# Patient Record
Sex: FEMALE | Race: WHITE | URBAN - METROPOLITAN AREA
[De-identification: names, ages, dates, MRNs, and addresses within clinical notes are randomized per-mention and may not be internally consistent; named-entity substitution may affect disease eponyms.]

---

## 2018-01-08 ENCOUNTER — ANESTHESIA EVENT (OUTPATIENT)
Dept: SURGERY | Facility: CLINIC | Age: 18
End: 2018-01-08
Payer: COMMERCIAL

## 2018-01-08 ENCOUNTER — HOSPITAL ENCOUNTER (OUTPATIENT)
Facility: CLINIC | Age: 18
Discharge: HOME | End: 2018-01-08
Attending: ORTHOPAEDIC SURGERY | Admitting: ORTHOPAEDIC SURGERY
Payer: COMMERCIAL

## 2018-01-08 ENCOUNTER — ANESTHESIA (OUTPATIENT)
Dept: SURGERY | Facility: CLINIC | Age: 18
End: 2018-01-08
Payer: COMMERCIAL

## 2018-01-08 VITALS
OXYGEN SATURATION: 97 % | HEIGHT: 68 IN | TEMPERATURE: 98.4 F | DIASTOLIC BLOOD PRESSURE: 77 MMHG | BODY MASS INDEX: 35.57 KG/M2 | SYSTOLIC BLOOD PRESSURE: 125 MMHG | RESPIRATION RATE: 20 BRPM | WEIGHT: 234.7 LBS

## 2018-01-08 DIAGNOSIS — Z98.890 S/P ANKLE LIGAMENT REPAIR: Primary | ICD-10-CM

## 2018-01-08 LAB — HCG UR QL: NEGATIVE

## 2018-01-08 PROCEDURE — 25000128 H RX IP 250 OP 636: Performed by: ANESTHESIOLOGY

## 2018-01-08 PROCEDURE — 37000009 ZZH ANESTHESIA TECHNICAL FEE, EACH ADDTL 15 MIN: Performed by: ORTHOPAEDIC SURGERY

## 2018-01-08 PROCEDURE — 27210794 ZZH OR GENERAL SUPPLY STERILE: Performed by: ORTHOPAEDIC SURGERY

## 2018-01-08 PROCEDURE — 71000012 ZZH RECOVERY PHASE 1 LEVEL 1 FIRST HR: Performed by: ORTHOPAEDIC SURGERY

## 2018-01-08 PROCEDURE — 36000058 ZZH SURGERY LEVEL 3 EA 15 ADDTL MIN: Performed by: ORTHOPAEDIC SURGERY

## 2018-01-08 PROCEDURE — 81025 URINE PREGNANCY TEST: CPT | Performed by: ANESTHESIOLOGY

## 2018-01-08 PROCEDURE — 36000060 ZZH SURGERY LEVEL 3 W FLUORO 1ST 30 MIN: Performed by: ORTHOPAEDIC SURGERY

## 2018-01-08 PROCEDURE — 40000170 ZZH STATISTIC PRE-PROCEDURE ASSESSMENT II: Performed by: ORTHOPAEDIC SURGERY

## 2018-01-08 PROCEDURE — 25000132 ZZH RX MED GY IP 250 OP 250 PS 637: Performed by: PHYSICIAN ASSISTANT

## 2018-01-08 PROCEDURE — 71000027 ZZH RECOVERY PHASE 2 EACH 15 MINS: Performed by: ORTHOPAEDIC SURGERY

## 2018-01-08 PROCEDURE — 25000125 ZZHC RX 250: Performed by: NURSE ANESTHETIST, CERTIFIED REGISTERED

## 2018-01-08 PROCEDURE — 25000128 H RX IP 250 OP 636: Performed by: NURSE ANESTHETIST, CERTIFIED REGISTERED

## 2018-01-08 PROCEDURE — 27110028 ZZH OR GENERAL SUPPLY NON-STERILE: Performed by: ORTHOPAEDIC SURGERY

## 2018-01-08 PROCEDURE — 71000013 ZZH RECOVERY PHASE 1 LEVEL 1 EA ADDTL HR: Performed by: ORTHOPAEDIC SURGERY

## 2018-01-08 PROCEDURE — 25000128 H RX IP 250 OP 636: Performed by: PHYSICIAN ASSISTANT

## 2018-01-08 PROCEDURE — 37000008 ZZH ANESTHESIA TECHNICAL FEE, 1ST 30 MIN: Performed by: ORTHOPAEDIC SURGERY

## 2018-01-08 PROCEDURE — C1713 ANCHOR/SCREW BN/BN,TIS/BN: HCPCS | Performed by: ORTHOPAEDIC SURGERY

## 2018-01-08 RX ORDER — CEFAZOLIN SODIUM 1 G
1 VIAL (EA) INJECTION SEE ADMIN INSTRUCTIONS
Status: DISCONTINUED | OUTPATIENT
Start: 2018-01-08 | End: 2018-01-08 | Stop reason: HOSPADM

## 2018-01-08 RX ORDER — NALOXONE HYDROCHLORIDE 0.4 MG/ML
.1-.4 INJECTION, SOLUTION INTRAMUSCULAR; INTRAVENOUS; SUBCUTANEOUS
Status: DISCONTINUED | OUTPATIENT
Start: 2018-01-08 | End: 2018-01-08 | Stop reason: HOSPADM

## 2018-01-08 RX ORDER — PROPOFOL 10 MG/ML
INJECTION, EMULSION INTRAVENOUS PRN
Status: DISCONTINUED | OUTPATIENT
Start: 2018-01-08 | End: 2018-01-08

## 2018-01-08 RX ORDER — LIDOCAINE HYDROCHLORIDE 20 MG/ML
INJECTION, SOLUTION INFILTRATION; PERINEURAL PRN
Status: DISCONTINUED | OUTPATIENT
Start: 2018-01-08 | End: 2018-01-08

## 2018-01-08 RX ORDER — HYDROCODONE BITARTRATE AND ACETAMINOPHEN 5; 325 MG/1; MG/1
1 TABLET ORAL
Status: COMPLETED | OUTPATIENT
Start: 2018-01-08 | End: 2018-01-08

## 2018-01-08 RX ORDER — FENTANYL CITRATE 50 UG/ML
INJECTION, SOLUTION INTRAMUSCULAR; INTRAVENOUS PRN
Status: DISCONTINUED | OUTPATIENT
Start: 2018-01-08 | End: 2018-01-08

## 2018-01-08 RX ORDER — PROPOFOL 10 MG/ML
INJECTION, EMULSION INTRAVENOUS CONTINUOUS PRN
Status: DISCONTINUED | OUTPATIENT
Start: 2018-01-08 | End: 2018-01-08

## 2018-01-08 RX ORDER — KETOROLAC TROMETHAMINE 30 MG/ML
30 INJECTION, SOLUTION INTRAMUSCULAR; INTRAVENOUS EVERY 6 HOURS PRN
Status: DISCONTINUED | OUTPATIENT
Start: 2018-01-08 | End: 2018-01-08 | Stop reason: HOSPADM

## 2018-01-08 RX ORDER — ONDANSETRON 2 MG/ML
INJECTION INTRAMUSCULAR; INTRAVENOUS PRN
Status: DISCONTINUED | OUTPATIENT
Start: 2018-01-08 | End: 2018-01-08

## 2018-01-08 RX ORDER — HYDROXYZINE HYDROCHLORIDE 25 MG/1
25 TABLET, FILM COATED ORAL EVERY 6 HOURS PRN
Qty: 30 TABLET | Refills: 0 | Status: SHIPPED | OUTPATIENT
Start: 2018-01-08

## 2018-01-08 RX ORDER — SODIUM CHLORIDE, SODIUM LACTATE, POTASSIUM CHLORIDE, CALCIUM CHLORIDE 600; 310; 30; 20 MG/100ML; MG/100ML; MG/100ML; MG/100ML
INJECTION, SOLUTION INTRAVENOUS CONTINUOUS
Status: DISCONTINUED | OUTPATIENT
Start: 2018-01-08 | End: 2018-01-08 | Stop reason: HOSPADM

## 2018-01-08 RX ORDER — IBUPROFEN 600 MG/1
600 TABLET, FILM COATED ORAL EVERY 6 HOURS PRN
Qty: 40 TABLET | Refills: 0 | Status: SHIPPED | OUTPATIENT
Start: 2018-01-08

## 2018-01-08 RX ORDER — MEPERIDINE HYDROCHLORIDE 25 MG/ML
12.5 INJECTION INTRAMUSCULAR; INTRAVENOUS; SUBCUTANEOUS
Status: DISCONTINUED | OUTPATIENT
Start: 2018-01-08 | End: 2018-01-08 | Stop reason: HOSPADM

## 2018-01-08 RX ORDER — SODIUM CHLORIDE, SODIUM LACTATE, POTASSIUM CHLORIDE, CALCIUM CHLORIDE 600; 310; 30; 20 MG/100ML; MG/100ML; MG/100ML; MG/100ML
INJECTION, SOLUTION INTRAVENOUS CONTINUOUS PRN
Status: DISCONTINUED | OUTPATIENT
Start: 2018-01-08 | End: 2018-01-08

## 2018-01-08 RX ORDER — ONDANSETRON 4 MG/1
4 TABLET, ORALLY DISINTEGRATING ORAL
Status: DISCONTINUED | OUTPATIENT
Start: 2018-01-08 | End: 2018-01-08 | Stop reason: HOSPADM

## 2018-01-08 RX ORDER — ONDANSETRON 4 MG/1
4 TABLET, ORALLY DISINTEGRATING ORAL EVERY 30 MIN PRN
Status: DISCONTINUED | OUTPATIENT
Start: 2018-01-08 | End: 2018-01-08 | Stop reason: HOSPADM

## 2018-01-08 RX ORDER — FENTANYL CITRATE 50 UG/ML
25-50 INJECTION, SOLUTION INTRAMUSCULAR; INTRAVENOUS
Status: DISCONTINUED | OUTPATIENT
Start: 2018-01-08 | End: 2018-01-08 | Stop reason: HOSPADM

## 2018-01-08 RX ORDER — AMOXICILLIN 250 MG
1-2 CAPSULE ORAL 2 TIMES DAILY
Qty: 30 TABLET | Refills: 0 | Status: SHIPPED | OUTPATIENT
Start: 2018-01-08

## 2018-01-08 RX ORDER — HYDROCODONE BITARTRATE AND ACETAMINOPHEN 5; 325 MG/1; MG/1
1-2 TABLET ORAL EVERY 4 HOURS PRN
Qty: 30 TABLET | Refills: 0 | Status: SHIPPED | OUTPATIENT
Start: 2018-01-08

## 2018-01-08 RX ORDER — FENTANYL CITRATE 50 UG/ML
25-50 INJECTION, SOLUTION INTRAMUSCULAR; INTRAVENOUS EVERY 5 MIN PRN
Status: DISCONTINUED | OUTPATIENT
Start: 2018-01-08 | End: 2018-01-08 | Stop reason: HOSPADM

## 2018-01-08 RX ORDER — HYDROMORPHONE HYDROCHLORIDE 1 MG/ML
.3-.5 INJECTION, SOLUTION INTRAMUSCULAR; INTRAVENOUS; SUBCUTANEOUS EVERY 10 MIN PRN
Status: DISCONTINUED | OUTPATIENT
Start: 2018-01-08 | End: 2018-01-08 | Stop reason: HOSPADM

## 2018-01-08 RX ORDER — ONDANSETRON 2 MG/ML
4 INJECTION INTRAMUSCULAR; INTRAVENOUS EVERY 30 MIN PRN
Status: DISCONTINUED | OUTPATIENT
Start: 2018-01-08 | End: 2018-01-08 | Stop reason: HOSPADM

## 2018-01-08 RX ORDER — DEXAMETHASONE SODIUM PHOSPHATE 4 MG/ML
INJECTION, SOLUTION INTRA-ARTICULAR; INTRALESIONAL; INTRAMUSCULAR; INTRAVENOUS; SOFT TISSUE PRN
Status: DISCONTINUED | OUTPATIENT
Start: 2018-01-08 | End: 2018-01-08

## 2018-01-08 RX ADMIN — SODIUM CHLORIDE, POTASSIUM CHLORIDE, SODIUM LACTATE AND CALCIUM CHLORIDE: 600; 310; 30; 20 INJECTION, SOLUTION INTRAVENOUS at 10:25

## 2018-01-08 RX ADMIN — KETOROLAC TROMETHAMINE 30 MG: 30 INJECTION, SOLUTION INTRAMUSCULAR; INTRAVENOUS at 12:17

## 2018-01-08 RX ADMIN — FENTANYL CITRATE 50 MCG: 50 INJECTION, SOLUTION INTRAMUSCULAR; INTRAVENOUS at 11:02

## 2018-01-08 RX ADMIN — FENTANYL CITRATE 50 MCG: 50 INJECTION, SOLUTION INTRAMUSCULAR; INTRAVENOUS at 11:54

## 2018-01-08 RX ADMIN — CEFAZOLIN SODIUM 2 G: 2 SOLUTION INTRAVENOUS at 11:08

## 2018-01-08 RX ADMIN — DEXMEDETOMIDINE HYDROCHLORIDE 8 MCG: 100 INJECTION, SOLUTION INTRAVENOUS at 11:20

## 2018-01-08 RX ADMIN — Medication 0.5 MG: at 12:34

## 2018-01-08 RX ADMIN — PROPOFOL 175 MCG/KG/MIN: 10 INJECTION, EMULSION INTRAVENOUS at 11:03

## 2018-01-08 RX ADMIN — PROPOFOL 200 MG: 10 INJECTION, EMULSION INTRAVENOUS at 11:03

## 2018-01-08 RX ADMIN — LIDOCAINE HYDROCHLORIDE 80 MG: 20 INJECTION, SOLUTION INFILTRATION; PERINEURAL at 11:03

## 2018-01-08 RX ADMIN — FENTANYL CITRATE 50 MCG: 50 INJECTION, SOLUTION INTRAMUSCULAR; INTRAVENOUS at 11:45

## 2018-01-08 RX ADMIN — HYDROCODONE BITARTRATE AND ACETAMINOPHEN 1 TABLET: 5; 325 TABLET ORAL at 12:17

## 2018-01-08 RX ADMIN — MIDAZOLAM 2 MG: 1 INJECTION INTRAMUSCULAR; INTRAVENOUS at 10:25

## 2018-01-08 RX ADMIN — ONDANSETRON 4 MG: 2 INJECTION INTRAMUSCULAR; INTRAVENOUS at 11:08

## 2018-01-08 RX ADMIN — DEXAMETHASONE SODIUM PHOSPHATE 4 MG: 4 INJECTION, SOLUTION INTRA-ARTICULAR; INTRALESIONAL; INTRAMUSCULAR; INTRAVENOUS; SOFT TISSUE at 11:08

## 2018-01-08 RX ADMIN — FENTANYL CITRATE 50 MCG: 50 INJECTION, SOLUTION INTRAMUSCULAR; INTRAVENOUS at 12:03

## 2018-01-08 RX ADMIN — MIDAZOLAM 2 MG: 1 INJECTION INTRAMUSCULAR; INTRAVENOUS at 11:01

## 2018-01-08 RX ADMIN — ROPIVACAINE HYDROCHLORIDE 50 ML: 5 INJECTION, SOLUTION EPIDURAL; INFILTRATION; PERINEURAL at 10:35

## 2018-01-08 NOTE — OP NOTE
DATE OF PROCEDURE:  01/08/2018       PREOPERATIVE DIAGNOSIS:  Chronic instability of the left ankle.      POSTOPERATIVE DIAGNOSIS:  Chronic instability of the left ankle.      PROCEDURE:     1.  Arthroscopy and arthroscopic debridement of the left ankle.   2.  Open Brostrom lateral ligament repair augmented with an internal brace.      ANESTHESIA:  General.      ASSISTANT:  JUANA Francois       PREAMBLE:  Ms. Arianna Harper presented with a chronic instability of her left ankle.  She tried conservative management to no avail.  Informed consent was obtained for the above-mentioned procedure.      Two grams of Ancef was given prior to surgery.  There is no need for postoperative antibiotic prophylaxis.      DESCRIPTION OF PROCEDURE:  After adequate induction of a general anesthetic, the patient was positioned supine on the operating table.  The left leg was sterilely prepped and free draped in the usual fashion.  Tourniquet around the thigh was inflated to 300 mmHg.      A standard arthroscopy was done with medial and lateral portals.  The scope was first inserted through the medial portal.  There was moderate synovitis of the joint, and a partial synovectomy was done.  There was significant anterolateral tissue overgrowth due to the instability, and this was removed with a shaver.  She had a positive anterior drawer and talar tilt test on the lateral side.      The deltoid ligament was intact.  There was grade 1 softening of the articular cartilage over the medial talar dome, but overall the articular cartilage was in good condition.      The scope was then removed.  This was followed by a curvilinear incision lateral over the ankle.  The lateral ligament complex was exposed.  A Brostrom type repair was done, shortening and imbricating the anterior talofibular and calcaneofibular ligaments onto the fibula with FiberWire suture.  Stable repair was obtained.      Due to the longstanding instability, an internal  brace was used to augment the repair with SwiveLock anchors in the foot prints of the ATF in the fibula and the talus.  A very stable repair was obtained.  The tourniquet was deflated.  Hemostasis was obtained.  A sterile dressing and a light compressive bandage were applied, followed by a short-leg cast.  She tolerated the procedure well and was taken to the recovery room in satisfactory condition.      She can ambulate weightbearing as tolerated.  The sutures will be removed in 2 weeks.         CLAYTON SALAZAR MD             D: 2018 11:47   T: 2018 13:59   MT: NEO#[id number]      Name:     GAURANG LESLIE   MRN:      [id number]        Account:        [id number]   :      2000           Procedure Date: 2018      Document: [id number]

## 2018-01-08 NOTE — DISCHARGE INSTRUCTIONS
Same Day Surgery Discharge Instructions for  Sedation and General Anesthesia       It's not unusual to feel dizzy, light-headed or faint for up to 24 hours after surgery or while taking pain medication.  If you have these symptoms: sit for a few minutes before standing and have someone assist you when you get up to walk or use the bathroom.      You should rest and relax for the next 24 hours. We recommend you make arrangements to have an adult stay with you for at least 24 hours after your discharge.  Avoid hazardous and strenuous activity.      DO NOT DRIVE any vehicle or operate mechanical equipment for 24 hours following the end of your surgery.  Even though you may feel normal, your reactions may be affected by the medication you have received.      Do not drink alcoholic beverages for 24 hours following surgery.       Slowly progress to your regular diet as you feel able. It's not unusual to feel nauseated and/or vomit after receiving anesthesia.  If you develop these symptoms, drink clear liquids (apple juice, ginger ale, broth, 7-up, etc. ) until you feel better.  If your nausea and vomiting persists for 24 hours, please notify your surgeon.        All narcotic pain medications, along with inactivity and anesthesia, can cause constipation. Drinking plenty of liquids and increasing fiber intake will help.      For any questions of a medical nature, call your surgeon.      Do not make important decisions for 24 hours.      If you had general anesthesia, you may have a sore throat for a couple of days related to the breathing tube used during surgery.  You may use Cepacol lozenges to help with this discomfort.  If it worsens or if you develop a fever, contact your surgeon.       If you feel your pain is not well managed with the pain medications prescribed by your surgeon, please contact your surgeon's office to let them know so they can address your concerns.       POST-OPERATIVE INSTRUCTIONS  FOOT AND ANKLE  SURGERY  ELAN Joshi M.D.  Barry Mejia P.A.-C    These precautions MUST be followed for the first 24 hours after surgery:    Upon discharge, go directly home.    You must make arrangements to have a responsible adult stay with you.    DO NOT DRINK ALCOHOLIC BEVERAGES    It is not unusual to feel lightheaded up to 24 hours after surgery or while taking pain medication.  If you feel lightheaded, sit for a few minutes before standing and have someone assisted you when you get up to walk or use the restroom.    Do not use any mechanical equipment.    Do not make any important or legal decisions for 24 hours or while on pain medications.    You may experience dry mouth, sore throat, or sleep disturbances from the anesthesia and medications used during surgery.  Generalized muscle aches can sometimes occur.  These usually disappear in 12-24 hours.    POST-OPERATIVE CARE GUIDELINES    The following are general guidelines about what to expect the first days/weeks after surgery.  They are not specific, and your recovery may be slightly different.    Blood clots are not common, but are emergencies.  If you have sudden onset pain in your calf or leg, or have sudden shortness of breath, go to the Emergency Department.      Elevation of your operative foot/ankle is key to reducing the swelling in the immediate post-operative phase (first 3-5 days).  When you are at rest, elevate your foot at or above the level of your heart.  When sitting, your foot should be elevated on a chair or stool; not hanging down.      You should plan to rest the day after surgery no matter how minor the procedure.  More complicated procedures will require more time to return to normal activity.      Foot and ankle surgery is painful in most cases - use your medication as directed for the first 24 hours after surgery.  It is not unusual for the pain to be worse a day or two after surgery than on the day of.  If your pain is more than 8/10  contact our office.  If you don t have pain, gradually decrease your pain meds by substituting Tylenol.  Please don t use Advil/ibuprofen unless we order it for you.      You will be prescribed Percocet or Vicodin for pain, Vistaril for spasms/pain adjunct, Senokot for constipation.  If you have had trouble taking these meds before or experience nausea or vomiting after surgery from your medication, please advise the recovery room nurses.  If you are already at home, try drinking only clear liquids and/or call our office.      All pain medications, along with inactivity can cause constipation.  Use the Senakot as directed, increase fluid intake to 1 quart per day and increase your dietary fiber.  (The  P  fruits - peaches, plums, pears, and prunes as well as anise/black licorice are recommended.)    It is not unusual to run a low-grade fever after surgery.  If your temperature is elevated above 102 , lasts longer than 24 hours, or is questionable in any way, contact our office.      Drainage from your cast/dressing is normal.  Reinforce your cast/dressing with 4x4 dressings and cover with an Ace wrap.  Wait until 24 hours after surgery to change your dressings; by this time most of your bleeding will have stopped.  If you have drainage through your dressings 2 days after surgery, contact our office.      You cast/dressing will be changed at your 2-week follow up appointment.  They should be kept clean and DRY.  If your cast/dressing is damaged before that, contact our office.      It is normal to experience some bruising in the toes after surgery and they may appear  dark  when your foot hangs down.  It is important to actively wiggle your toes for at least 5-10 minutes each hour UNLESS you had surgery on those toes.      Use ice on your foot/ankle over the dressing/cast for 20 minutes per hour, 10 or more times per day.  A large bag of frozen peas works well.      Bathing: take a tub or sponge bath instead of a  shower if possible during the first two weeks.  If you choose to shower, cover the dressing/cast with a waterproof covering, these may be ordered from www.seal-tight.com or are available at some pharmacies/medical supply stores.  Another option is XeroSox, which is the original vacuum sealed bandage and cast cover.  The cast cover has a vacuum seal and is absolutely waterproof.  [phone] or www.xerosox[employer] for more information.      Driving:  For surgery on the left foot/ankle, you may drive as soon as narcotic medications are stopped.  For surgery on the right foot/ankle, you may drive when you are out of a cast, off pain medications, and you feel secure with braking.      In general, listen to your foot/ankle.  If you have a sudden, dramatic increase in pain that does not resolve after an hour or so, something is wrong - call our office.  If you fall or bump your foot/ankle and have sudden pain that resolves, give it 24 hours before you call.      Many of your questions can be addressed at your 2-week follow-up appointment - please make a list of things to ask us as they come up during your recovery.      If you had a nerve block it is common to have numbness in your leg and foot for up to 30 hours after surgery.  If your leg or foot is still numb more than 30 hours after surgery, please call the office.      FUTURE DENTIST VISITS:  If you have had a total ankle arthroplasty please be advised you must be on antibiotics prior to ANY dental work.  Please call your dentist office ahead of time and make them aware of this as your dentist will be able to order the prescription.  If you have had any other surgery this is not a concern.    If you have any further questions or concerns, please call our office at [phone]    **If you have questions or concerns about your procedure,   call Dr. Joshi at [phone]**    While you were at the hospital today you received Toradol, an antiinflammatory medication  "similar to Ibuprofen.  You should not take other antiinflammatory medication, such as Ibuprofen, Motrin, Advil, Aleve, Naprosyn, etc, until 6:15pm.         [facility]      DISCHARGE INSTRUCTIONS FOLLOWING   REGIONAL BLOCK ANESTHESIA      Numbness or lack of feeling in the arm/leg that was operated may last up to 24 hours.  The average time is usually 10-15 hours.  You may not be able to lift or move the arm or leg where the operation was by itself during that time.  Long-acting local anesthetic medicines were used to give you long-lasting pain relief.    Wear a sling until your arm is completely \"awake\"    Avoid bumping your arm, leg or foot while it is numb    Avoid extremes of hot or cold while it is numb    Remain quiet and restful the day of surgery.  Resume normal activities gradually over the next day or so as advise by your surgeon.    Do not drive or operate  Any machinery until your extremity is full  \"awake\"        You will have a tingling and prickly sensation in your arm/leg as the feeling begins to return; you can also expect some discomfort. The amount of discomfort is unpredictable, but if you have more pain that can be controlled with the pain medication you received, you should contact your surgeon.  Start to take your pain pills as soon as you start to feel any discomfort or pain.  We strongly recommend starting your pain medication at bedtime if you haven't already done so.  This is in the event that the block wears off while you are asleep.                    "

## 2018-01-08 NOTE — ANESTHESIA CARE TRANSFER NOTE
Patient: Arianna Harper    Procedure(s):  LEFT ANKLE SCOPE BROSTROM AND INTERNAL BRACE(INTERNAL BRACE AND SCOPE) - Wound Class: I-Clean    Diagnosis: ANKLE INSTABILITY  Diagnosis Additional Information: No value filed.    Anesthesia Type:   General, LMA, Periph. Nerve Block for postop pain     Note:  Airway :Face Mask  Patient transferred to:PACU  Comments: At end of procedure, spontaneous respirations, adequate tidal volumes, followed commands to voice, LMA removed atraumatically, oropharynx suctioned, airway patent after LMA removal. Oxygen via facemask at 8 liters per minute to PACU. Oxygen tubing connected to wall O2 in PACU, SpO2, NiBP, and EKG monitors and alarms on and functioning, John Hugger warmer connected to patient gown, report on patient's clinical status given to PACU RN, RN questions answered.Handoff Report: Identifed the Patient, Identified the Reponsible Provider, Reviewed the pertinent medical history, Discussed the surgical course, Reviewed Intra-OP anesthesia mangement and issues during anesthesia, Set expectations for post-procedure period and Allowed opportunity for questions and acknowledgement of understanding      Vitals: (Last set prior to Anesthesia Care Transfer)    CRNA VITALS  1/8/2018 1112 - 1/8/2018 1146      1/8/2018             Resp Rate (observed): 8                Electronically Signed By: KENTRELL Ibarra CRNA  January 8, 2018  11:46 AM

## 2018-01-08 NOTE — ANESTHESIA POSTPROCEDURE EVALUATION
Patient: Arianna Harper    Procedure(s):  LEFT ANKLE SCOPE BROSTROM AND INTERNAL BRACE(INTERNAL BRACE AND SCOPE) - Wound Class: I-Clean    Diagnosis:ANKLE INSTABILITY  Diagnosis Additional Information: No value filed.    Anesthesia Type:  General, LMA, Periph. Nerve Block for postop pain    Note:  Anesthesia Post Evaluation    Patient location during evaluation: PACU  Patient participation: Able to fully participate in evaluation  Level of consciousness: awake  Pain management: adequate  Airway patency: patent  Cardiovascular status: acceptable  Respiratory status: acceptable  Hydration status: acceptable  PONV: none     Anesthetic complications: None          Last vitals:  Vitals:    01/08/18 1230 01/08/18 1245 01/08/18 1336   BP: 123/75 124/75 125/77   Resp: 28 19 20   Temp: 36.7  C (98.1  F) 36.9  C (98.4  F)    SpO2: 98% 97% 97%         Electronically Signed By: Christiano Cantrell MD  January 8, 2018  1:39 PM

## 2018-01-08 NOTE — ANESTHESIA PREPROCEDURE EVALUATION
Anesthesia Evaluation     .      No history of anesthetic complications          ROS/MED HX    ENT/Pulmonary:  - neg pulmonary ROS     Neurologic:       Cardiovascular:  - neg cardiovascular ROS       METS/Exercise Tolerance:     Hematologic:         Musculoskeletal:         GI/Hepatic:  - neg GI/hepatic ROS       Renal/Genitourinary:         Endo:     (+) Obesity, .      Psychiatric:         Infectious Disease:         Malignancy:         Other:                     Physical Exam  Normal systems: cardiovascular, pulmonary and dental    Airway   Mallampati: I  TM distance: >3 FB  Neck ROM: full    Dental     Cardiovascular   Rhythm and rate: regular and normal      Pulmonary    breath sounds clear to auscultation                    Anesthesia Plan      History & Physical Review  History and physical reviewed and following examination; no interval change.    ASA Status:  2 .    NPO Status:  > 8 hours    Plan for General, LMA and Periph. Nerve Block for postop pain with Propofol induction. Maintenance will be TIVA.    PONV prophylaxis:  Ondansetron (or other 5HT-3) and Dexamethasone or Solumedrol       Postoperative Care  Postoperative pain management:  IV analgesics and Oral pain medications.      Consents  Anesthetic plan, risks, benefits and alternatives discussed with:  Patient..                          .

## 2018-01-08 NOTE — IP AVS SNAPSHOT
MRN:[id number]                      After Visit Summary   1/8/2018    Arianna Harper    MRN: [id number]           Thank you!     Thank you for choosing [facility] for your care. Our goal is always to provide you with excellent care. Hearing back from our patients is one way we can continue to improve our services. Please take a few minutes to complete the written survey that you may receive in the mail after you visit with us. Thank you!        Patient Information     Date Of Birth          2000        About your hospital stay     You were admitted on:  January 8, 2018 You last received care in the:  [facility]    You were discharged on:  January 8, 2018       Who to Call     For medical emergencies, please call 911.  For non-urgent questions about your medical care, please call your primary care provider or clinic, [phone]  For questions related to your surgery, please call your surgery clinic        Attending Provider     Provider Christie Hernandez MD Orthopaedic Surgery       Primary Care Provider Office Phone # Fax #    Kristal Duran [phone]      After Care Instructions     Activity       Ambulate with assistance until independent            Discharge Instructions       Review discharge instructions as directed by Provider.            Discharge Instructions       Patient to arrange for return to clinic appointment in two weeks.            Elevate affected extremity           Ice to affected area       Ice pack to affected area PRN (as needed).            No dressing change       until follow up clinic appointment.            No driving or operating machinery       until the day after procedure            Weight bearing status - As tolerated                 Further instructions from your care team       Same Day Surgery Discharge Instructions for  Sedation and General Anesthesia       It's not unusual to feel dizzy,  light-headed or faint for up to 24 hours after surgery or while taking pain medication.  If you have these symptoms: sit for a few minutes before standing and have someone assist you when you get up to walk or use the bathroom.      You should rest and relax for the next 24 hours. We recommend you make arrangements to have an adult stay with you for at least 24 hours after your discharge.  Avoid hazardous and strenuous activity.      DO NOT DRIVE any vehicle or operate mechanical equipment for 24 hours following the end of your surgery.  Even though you may feel normal, your reactions may be affected by the medication you have received.      Do not drink alcoholic beverages for 24 hours following surgery.       Slowly progress to your regular diet as you feel able. It's not unusual to feel nauseated and/or vomit after receiving anesthesia.  If you develop these symptoms, drink clear liquids (apple juice, ginger ale, broth, 7-up, etc. ) until you feel better.  If your nausea and vomiting persists for 24 hours, please notify your surgeon.        All narcotic pain medications, along with inactivity and anesthesia, can cause constipation. Drinking plenty of liquids and increasing fiber intake will help.      For any questions of a medical nature, call your surgeon.      Do not make important decisions for 24 hours.      If you had general anesthesia, you may have a sore throat for a couple of days related to the breathing tube used during surgery.  You may use Cepacol lozenges to help with this discomfort.  If it worsens or if you develop a fever, contact your surgeon.       If you feel your pain is not well managed with the pain medications prescribed by your surgeon, please contact your surgeon's office to let them know so they can address your concerns.       POST-OPERATIVE INSTRUCTIONS  FOOT AND ANKLE SURGERY  ELAN Joshi M.D.  Barry Mejia P.A.-C    These precautions MUST be followed for the first 24 hours  after surgery:    Upon discharge, go directly home.    You must make arrangements to have a responsible adult stay with you.    DO NOT DRINK ALCOHOLIC BEVERAGES    It is not unusual to feel lightheaded up to 24 hours after surgery or while taking pain medication.  If you feel lightheaded, sit for a few minutes before standing and have someone assisted you when you get up to walk or use the restroom.    Do not use any mechanical equipment.    Do not make any important or legal decisions for 24 hours or while on pain medications.    You may experience dry mouth, sore throat, or sleep disturbances from the anesthesia and medications used during surgery.  Generalized muscle aches can sometimes occur.  These usually disappear in 12-24 hours.    POST-OPERATIVE CARE GUIDELINES    The following are general guidelines about what to expect the first days/weeks after surgery.  They are not specific, and your recovery may be slightly different.    Blood clots are not common, but are emergencies.  If you have sudden onset pain in your calf or leg, or have sudden shortness of breath, go to the Emergency Department.      Elevation of your operative foot/ankle is key to reducing the swelling in the immediate post-operative phase (first 3-5 days).  When you are at rest, elevate your foot at or above the level of your heart.  When sitting, your foot should be elevated on a chair or stool; not hanging down.      You should plan to rest the day after surgery no matter how minor the procedure.  More complicated procedures will require more time to return to normal activity.      Foot and ankle surgery is painful in most cases - use your medication as directed for the first 24 hours after surgery.  It is not unusual for the pain to be worse a day or two after surgery than on the day of.  If your pain is more than 8/10 contact our office.  If you don t have pain, gradually decrease your pain meds by substituting Tylenol.  Please don t use  Advil/ibuprofen unless we order it for you.      You will be prescribed Percocet or Vicodin for pain, Vistaril for spasms/pain adjunct, Senokot for constipation.  If you have had trouble taking these meds before or experience nausea or vomiting after surgery from your medication, please advise the recovery room nurses.  If you are already at home, try drinking only clear liquids and/or call our office.      All pain medications, along with inactivity can cause constipation.  Use the Senakot as directed, increase fluid intake to 1 quart per day and increase your dietary fiber.  (The  P  fruits - peaches, plums, pears, and prunes as well as anise/black licorice are recommended.)    It is not unusual to run a low-grade fever after surgery.  If your temperature is elevated above 102 , lasts longer than 24 hours, or is questionable in any way, contact our office.      Drainage from your cast/dressing is normal.  Reinforce your cast/dressing with 4x4 dressings and cover with an Ace wrap.  Wait until 24 hours after surgery to change your dressings; by this time most of your bleeding will have stopped.  If you have drainage through your dressings 2 days after surgery, contact our office.      You cast/dressing will be changed at your 2-week follow up appointment.  They should be kept clean and DRY.  If your cast/dressing is damaged before that, contact our office.      It is normal to experience some bruising in the toes after surgery and they may appear  dark  when your foot hangs down.  It is important to actively wiggle your toes for at least 5-10 minutes each hour UNLESS you had surgery on those toes.      Use ice on your foot/ankle over the dressing/cast for 20 minutes per hour, 10 or more times per day.  A large bag of frozen peas works well.      Bathing: take a tub or sponge bath instead of a shower if possible during the first two weeks.  If you choose to shower, cover the dressing/cast with a waterproof covering,  these may be ordered from www.seal-tight.com or are available at some pharmacies/medical supply stores.  Another option is XeroSox, which is the original vacuum sealed bandage and cast cover.  The cast cover has a vacuum seal and is absolutely waterproof.  1-221.197.9582 or www.xerosox.[employer] for more information.      Driving:  For surgery on the left foot/ankle, you may drive as soon as narcotic medications are stopped.  For surgery on the right foot/ankle, you may drive when you are out of a cast, off pain medications, and you feel secure with braking.      In general, listen to your foot/ankle.  If you have a sudden, dramatic increase in pain that does not resolve after an hour or so, something is wrong - call our office.  If you fall or bump your foot/ankle and have sudden pain that resolves, give it 24 hours before you call.      Many of your questions can be addressed at your 2-week follow-up appointment - please make a list of things to ask us as they come up during your recovery.      If you had a nerve block it is common to have numbness in your leg and foot for up to 30 hours after surgery.  If your leg or foot is still numb more than 30 hours after surgery, please call the office.      FUTURE DENTIST VISITS:  If you have had a total ankle arthroplasty please be advised you must be on antibiotics prior to ANY dental work.  Please call your dentist office ahead of time and make them aware of this as your dentist will be able to order the prescription.  If you have had any other surgery this is not a concern.    If you have any further questions or concerns, please call our office at [phone]    **If you have questions or concerns about your procedure,   call Dr. Joshi at [phone]**    While you were at the hospital today you received Toradol, an antiinflammatory medication similar to Ibuprofen.  You should not take other antiinflammatory medication, such as Ibuprofen, Motrin, Advil, Aleve,  "Naprosyn, etc, until 6:15pm.         [facility]      DISCHARGE INSTRUCTIONS FOLLOWING   REGIONAL BLOCK ANESTHESIA      Numbness or lack of feeling in the arm/leg that was operated may last up to 24 hours.  The average time is usually 10-15 hours.  You may not be able to lift or move the arm or leg where the operation was by itself during that time.  Long-acting local anesthetic medicines were used to give you long-lasting pain relief.    Wear a sling until your arm is completely \"awake\"    Avoid bumping your arm, leg or foot while it is numb    Avoid extremes of hot or cold while it is numb    Remain quiet and restful the day of surgery.  Resume normal activities gradually over the next day or so as advise by your surgeon.    Do not drive or operate  Any machinery until your extremity is full  \"awake\"        You will have a tingling and prickly sensation in your arm/leg as the feeling begins to return; you can also expect some discomfort. The amount of discomfort is unpredictable, but if you have more pain that can be controlled with the pain medication you received, you should contact your surgeon.  Start to take your pain pills as soon as you start to feel any discomfort or pain.  We strongly recommend starting your pain medication at bedtime if you haven't already done so.  This is in the event that the block wears off while you are asleep.                      Pending Results     No orders found from 1/6/2018 to 1/9/2018.            Statement of Approval     Ordered          01/08/18 1037  I have reviewed and agree with all the recommendations and orders detailed in this document.  EFFECTIVE NOW     Approved and electronically signed by:  Braulio Mejia PA-C             Admission Information     Date & Time Provider Department Dept. Phone    1/8/2018 Christie Joshi MD [facility] Same Day [facility] [phone]      Your Vitals Were     Blood Pressure Temperature Respirations Height " "Weight Last Period     98.1  F (36.7  C) 28 1.715 m (5' 7.5\") 106.5 kg (234 lb 11.2 oz) 2017    Pulse Oximetry BMI (Body Mass Index)                98% 36.22 kg/m2          [employer] Information     [employer] lets you send messages to your doctor, view your test results, renew your prescriptions, schedule appointments and more. To sign up, go to [email or web address] . Click on \"Log in\" on the left side of the screen, which will take you to the Welcome page. Then click on \"Sign up Now\" on the right side of the page.     You will be asked to enter the access code listed below, as well as some personal information. Please follow the directions to create your username and password.     Your access code is: [email or web address]  Expires: 2018 12:19 PM     Your access code will  in 90 days. If you need help or a new code, please call your [facility] clinic or [phone].        Care EveryWhere ID     This is your Care EveryWhere ID. This could be used by other organizations to access your [facility] medical records  [id number]        Equal Access to Services     RAQUEL TSANG AH: Kristi Sawyer, waromario ashby, qahamiltonta kaalmada jigar, tran burns. So [facility] [phone].    ATENCIÓN: Si habla español, tiene a long disposición servicios gratuitos de asistencia lingüística. Llame al [phone].    We comply with applicable federal civil rights laws and [place] laws. We do not discriminate on the basis of race, color, national origin, age, disability, sex, sexual orientation, or gender identity.               Review of your medicines      START taking        Dose / Directions    HYDROcodone-acetaminophen 5-325 MG per tablet   Commonly known as:  NORCO   Used for:  S/P ankle ligament repair        Dose:  1-2 tablet   Take 1-2 tablets by mouth every 4 hours as needed for other (Moderate to Severe Pain)   Quantity:  30 tablet   Refills:  0       hydrOXYzine 25 MG tablet "   Commonly known as:  ATARAX   Used for:  S/P ankle ligament repair        Dose:  25 mg   Take 1 tablet (25 mg) by mouth every 6 hours as needed for itching (and nausea)   Quantity:  30 tablet   Refills:  0       ibuprofen 600 MG tablet   Commonly known as:  ADVIL/MOTRIN   Used for:  S/P ankle ligament repair        Dose:  600 mg   Take 1 tablet (600 mg) by mouth every 6 hours as needed for pain (mild)   Quantity:  40 tablet   Refills:  0       senna-docusate 8.6-50 MG per tablet   Commonly known as:  SENOKOT-S;PERICOLACE   Used for:  S/P ankle ligament repair        Dose:  1-2 tablet   Take 1-2 tablets by mouth 2 times daily Take while on oral narcotics to prevent or treat constipation.   Quantity:  30 tablet   Refills:  0         CONTINUE these medicines which have NOT CHANGED        Dose / Directions    VITAMIN C PO        Take by mouth as needed   Refills:  0            Where to get your medicines      These medications were sent to [facility] [place] - [place] - [address]  [address] [facility] [address], [facility] [address]     Phone:  [phone]     hydrOXYzine 25 MG tablet    ibuprofen 600 MG tablet    senna-docusate 8.6-50 MG per tablet         Some of these will need a paper prescription and others can be bought over the counter. Ask your nurse if you have questions.     Bring a paper prescription for each of these medications     HYDROcodone-acetaminophen 5-325 MG per tablet                Protect others around you: Learn how to safely use, store and throw away your medicines at www.disposemymeds.org.             Medication List: This is a list of all your medications and when to take them. Check marks below indicate your daily home schedule. Keep this list as a reference.      Medications           Morning Afternoon Evening Bedtime As Needed    HYDROcodone-acetaminophen 5-325 MG per tablet   Commonly known as:  NORCO   Take 1-2 tablets by mouth every 4 hours as needed for other (Moderate to  Severe Pain)   Last time this was given:  1 tablet on 1/8/2018 12:17 PM   Last time this was given:  Last dose was at 12:15 ( 1 tab given)                                hydrOXYzine 25 MG tablet   Commonly known as:  ATARAX   Take 1 tablet (25 mg) by mouth every 6 hours as needed for itching (and nausea)                                ibuprofen 600 MG tablet   Commonly known as:  ADVIL/MOTRIN   Take 1 tablet (600 mg) by mouth every 6 hours as needed for pain (mild)   Last time this was given:  Last dose given at 12:15 (Toradol; similar to Advil).                                senna-docusate 8.6-50 MG per tablet   Commonly known as:  SENOKOT-S;PERICOLACE   Take 1-2 tablets by mouth 2 times daily Take while on oral narcotics to prevent or treat constipation.                                VITAMIN C PO   Take by mouth as needed

## 2018-01-08 NOTE — IP AVS SNAPSHOT
[facility]    [address]    [place] [address]    Phone:  [phone]    Fax:  [phone]                                       After Visit Summary   1/8/2018    Arianna Harper    MRN: [id number]           After Visit Summary Signature Page     I have received my discharge instructions, and my questions have been answered. I have discussed any challenges I see with this plan with the nurse or doctor.    ..........................................................................................................................................  Patient/Patient Representative Signature      ..........................................................................................................................................  Patient Representative Print Name and Relationship to Patient    ..................................................               ................................................  Date                                            Time    ..........................................................................................................................................  Reviewed by Signature/Title    ...................................................              ..............................................  Date                                                            Time

## 2018-01-08 NOTE — ANESTHESIA PROCEDURE NOTES
Procedures  Left popliteal and saphenous blocks for pain relief at surgeon's request.  With the patient in the prone position, after IV started,  pulse oximeter in place and Versed 2mg IV: 22 GA Stimuplex and 25 GA; 50cc (40 + 10) 1/2 % Ropivicaine; 1;200,000 Epi   Depth 6cm

## 2018-02-02 ENCOUNTER — THERAPY VISIT (OUTPATIENT)
Dept: PHYSICAL THERAPY | Facility: CLINIC | Age: 18
End: 2018-02-02
Payer: COMMERCIAL

## 2018-02-02 DIAGNOSIS — M25.572 ANKLE PAIN, LEFT: Primary | ICD-10-CM

## 2018-02-02 PROCEDURE — 97161 PT EVAL LOW COMPLEX 20 MIN: CPT | Mod: GP | Performed by: PHYSICAL THERAPIST

## 2018-02-02 PROCEDURE — 97110 THERAPEUTIC EXERCISES: CPT | Mod: GP | Performed by: PHYSICAL THERAPIST

## 2018-02-02 NOTE — LETTER
[facility] ATHLETIC [facility] PHYSICAL THERAPY  [address] [place] [address]  [phone]    2018    Re: Arianna Harper   :   2000  MRN:  [id number]   REFERRING PHYSICIAN:   Christie Joshi    [facility] ATHLETIC [facility] PHYSICAL [facility]  Date of Initial Evaluation:  18  Visits:  Rxs Used: 1  Reason for Referral:  Ankle pain, left    EVALUATION SUMMARY    [facility] Athletic [facility] Initial Evaluation  Subjective:  Patient is a 17 year old female presenting with rehab left ankle/foot hpi.   Arianna Harper is a 17 year old female with a left ankle condition.  Condition occurred with:  Insidious onset.  Condition occurred: for unknown reasons.  This is a chronic condition  S/p L arthoscopic debridement and lateral ligament repair with internal brace 18  Pt had two ankle surgeries prior to this one to repair tendons. Continued to have difficulty with ankle (mobility, pain, function).   .    Patient reports pain:  Lateral.  Radiates to:  Foot (pt reports persistance numbness along bottom  of L foot. Reports surgeon okay with healing process, no tingling elsewhere).  Pain is described as aching and is intermittent and reported as 2/10.  Associated symptoms:  Edema, loss of motion/stiffness, loss of strength, numbness and tingling.   Symptoms are exacerbated by activity, lying on the extremity, ascending stairs and descending stairs and relieved by rest, ice and bracing/immobilizing (pt sleeping with CAM boot on to help tolerate pain).  Since onset symptoms are gradually improving.    Previous treatment includes surgery.  There was mild improvement following previous treatment.  General health as reported by patient is excellent.  Pertinent medical history includes:  None.  Medical allergies: no.  Other surgeries include:  Orthopedic surgery (previous L ankle surgeries).  Current medications:  None as reported by patient.  Current  occupation is Student.  Red flags:  None as reported by patient.    Objective:  Gait:  Ambulates with CAM boot on L  Tolerates walking 50' without boot with limited ankle DF/PF, decreased knee flexion and decreased gait speed, mild limp towards L side  Assistive Devices:  CAM    Ankle/Foot Evaluation  ROM:    AROM:    Dorsiflexion:  Left:   0  Right:   10  Plantarflexion:  Left:  52 pain    Right:  WNL  Inversion:  Left:  10     Right:  WNL  Eversion:  5     Right:  WNL    PROM:    Dorsiflexion:  Left:    14     Right:   20   Plantarflexion:  Left:    60 pain     Right:  WNL  Inversion:  Left:      20 pain     Right:   WNL  Re: Arianna A Harper     Eversion: Left:   10 pain     Right:  WNL  Strength is not assessed.    LIGAMENT TESTING: not assessed    SPECIAL TESTS: not assessed    PALPATION: Palpation of ankle: moderate pain along incicsion on lateral foot. denies tenderness thorughout other areas of L gastroc, ankle, foot.     EDEMA: Edema ankle: L gastroc moderate swelling.  Left ankle edema present at: 55.0 cm  Right ankle edema present at:  54.5      Figure 8 left: 55.0 cmFigure 8 right: 54.5    Hip Evaluation  Hip PROM:  Hip PROM:  Left Hip:    Normal  Right Hip:  Normal    Hip Strength:    Flexion:   Left: 5-/5   Pain:  Right: 5-/5   Pain:  Extension:  Left: 4+/5  Pain:Right: 4+/5    Pain:    Abduction:  Left: 4/5     Pain:Right: 4/5    Pain:  Knee Flexion:  Left: 5-/5   Pain:Right: 5-/5   Pain:  Knee Extension:  Left: 5/5   Pain:Right: 5/5    Pain:      Knee Evaluation:  ROM:  PROM: normal      Assessment/Plan:    Patient is a 17 year old female with left side ankle complaints.    Patient has the following significant findings with corresponding treatment plan.                Diagnosis 1:  L ankle pain. S/p L ankle arthoscopic debridement and lateral ligament repair with an internal brace.  Pain -  hot/cold therapy, US, electric stimulation, manual therapy, splint/taping/bracing/orthotics, self management,  education and home program  Decreased ROM/flexibility - manual therapy and therapeutic exercise  Decreased joint mobility - manual therapy and therapeutic exercise  Decreased strength - therapeutic exercise and therapeutic activities  Impaired balance - neuro re-education and therapeutic activities  Decreased proprioception - neuro re-education and therapeutic activities  Inflammation - cold therapy, US and electric stimulation  Edema - electric stimulation and cold therapy  Impaired gait - gait training    Therapy Evaluation Codes:   1) History comprised of:   Personal factors that impact the plan of care:      Past/current experiences.    Comorbidity factors that impact the plan of care are:      None.     Medications impacting care: None.  2) Examination of Body Systems comprised of:   Body structures and functions that impact the plan of care:      Ankle.     Re: Arianna Harper      Activity limitations that impact the plan of care are:      Jumping, Lifting, Sitting, Squatting/kneeling, Stairs, Standing, Walking, Working and Sleeping.  3) Clinical presentation characteristics are:   Stable/Uncomplicated.  4) Decision-Making    Low complexity using standardized patient assessment instrument and/or measureable assessment of functional outcome.  Cumulative Therapy Evaluation is: Low complexity.    Previous and current functional limitations:  (See Goal Flow Sheet for this information)    Short term and Long term goals: (See Goal Flow Sheet for this information)     Communication ability:  Patient appears to be able to clearly communicate and understand verbal and written communication and follow directions correctly.  Treatment Explanation - The following has been discussed with the patient:   RX ordered/plan of care  Anticipated outcomes  Possible risks and side effects  This patient would benefit from PT intervention to resume normal activities.   Rehab potential is good.    Frequency:  1 X week, once  daily  Duration:  for 12 weeks  Discharge Plan:  Achieve all LTG.  Independent in home treatment program.  Reach maximal therapeutic benefit.    Please refer to the daily flowsheet for treatment today, total treatment time and time spent performing 1:1 timed codes.     Thank you for your referral.    INQUIRIES  Therapist: Tito Joshua PT  [facility] PHYSICAL THERAPY  [address] [place] [address]  Phone: [phone]  Fax: [phone]

## 2018-02-02 NOTE — MR AVS SNAPSHOT
After Visit Summary   2/2/2018    Arianna Harper    MRN: [id number]           Patient Information     Date Of Birth          2000        Visit Information        Provider Department      2/2/2018 8:50 AM Tito Joshua PT [facility] Physical Therapy        Today's Diagnoses     Ankle pain, left    -  1       Follow-ups after your visit        Your next 10 appointments already scheduled     Feb 05, 2018 12:00 PM CST   ABIGAIL Extremity with Isabell Power PTA   [facility] Physical Therapy ([facility]  )    [address] [place] [address]   [phone]            Feb 12, 2018 12:00 PM CST   ABIGAIL Extremity with Isabell Power PTA   [facility] Physical Therapy ([facility]  )    [address] [place] [address]   [phone]            Feb 23, 2018  8:50 AM CST   ABIGAIL Extremity with [facility]ito Joshua PT   [facility] ([facility]  )    [address] [place] [address]   [phone]            Mar 02, 2018  8:50 AM CST   ABIGAIL Extremity with Isabell Power PTA   [facility] Physical Therapy ([facility]  )    [address] [place] [address]   [phone]              Who to contact     If you have questions or need follow up information about today's clinic visit or your schedule please contact [facility] ATHLETIC [facility] PHYSICAL THERAPY directly at [phone].  Normal or non-critical lab and imaging results will be communicated to you by MyChart, letter or phone within 4 business days after the clinic has received the results. If you do not hear from us within 7 days, please contact the clinic through MyChart or phone. If you have a critical or abnormal lab result, we will notify you by phone as soon as possible.  Submit refill requests through  "MyChart or call your pharmacy and they will forward the refill request to us. Please allow 3 business days for your refill to be completed.          Additional Information About Your Visit        MyChart Information     Teliportmehart lets you send messages to your doctor, view your test results, renew your prescriptions, schedule appointments and more. To sign up, go to [email or web address] . Click on \"Log in\" on the left side of the screen, which will take you to the Welcome page. Then click on \"Sign up Now\" on the right side of the page.     You will be asked to enter the access code listed below, as well as some personal information. Please follow the directions to create your username and password.     Your access code is: [email or web address]  Expires: 2018 12:19 PM     Your access code will  in 90 days. If you need help or a new code, please call your [facility] clinic or [phone].        Care EveryWhere ID     This is your Care EveryWhere ID. This could be used by other organizations to access your [facility] medical records  [id number]         Blood Pressure from Last 3 Encounters:   18 125/77    Weight from Last 3 Encounters:   18 106.5 kg (234 lb 11.2 oz) (>99 %)*     * Growth percentiles are based on CDC 2-20 Years data.              We Performed the Following     HC PT EVAL, LOW COMPLEXITY     ABIGAIL INITIAL EVAL REPORT     THERAPEUTIC EXERCISES        Primary Care Provider Office Phone # Fax #    Kristal Duran [phone]       [facility] [address] [facility] [address]        Equal Access to Services     [facility]: Hadii aad ku hadasho Soomaali, waaxda luqadaha, qaybta kaalmada adeegyada, tran arnold . So [facility] [phone].    ATENCIÓN: Si habla español, tiene a long disposición servicios gratuitos de asistencia lingüística. Llame al [phone].    We comply with applicable federal civil rights laws and [place] laws. We " do not discriminate on the basis of race, color, national origin, age, disability, sex, sexual orientation, or gender identity.            Thank you!     Thank you for choosing [place] [facility]IC [facility] PHYSICAL THERAPY  for your care. Our goal is always to provide you with excellent care. Hearing back from our patients is one way we can continue to improve our services. Please take a few minutes to complete the written survey that you may receive in the mail after your visit with us. Thank you!             Your Updated Medication List - Protect others around you: Learn how to safely use, store and throw away your medicines at www.disposemymeds.org.          This list is accurate as of 2/2/18 10:00 AM.  Always use your most recent med list.                   Brand Name Dispense Instructions for use Diagnosis    HYDROcodone-acetaminophen 5-325 MG per tablet    NORCO    30 tablet    Take 1-2 tablets by mouth every 4 hours as needed for other (Moderate to Severe Pain)    S/P ankle ligament repair       hydrOXYzine 25 MG tablet    ATARAX    30 tablet    Take 1 tablet (25 mg) by mouth every 6 hours as needed for itching (and nausea)    S/P ankle ligament repair       ibuprofen 600 MG tablet    ADVIL/MOTRIN    40 tablet    Take 1 tablet (600 mg) by mouth every 6 hours as needed for pain (mild)    S/P ankle ligament repair       senna-docusate 8.6-50 MG per tablet    SENOKOT-S;PERICOLACE    30 tablet    Take 1-2 tablets by mouth 2 times daily Take while on oral narcotics to prevent or treat constipation.    S/P ankle ligament repair       VITAMIN C PO      Take by mouth as needed

## 2018-02-02 NOTE — PROGRESS NOTES
[facility] Initial Evaluation  Subjective:  Patient is a 17 year old female presenting with rehab left ankle/foot hpi.   Arianna Harper is a 17 year old female with a left ankle condition.  Condition occurred with:  Insidious onset.  Condition occurred: for unknown reasons.  This is a chronic condition  S/p L arthoscopic debridement and lateral ligament repair with internal brace 1/8/18  Pt had two ankle surgeries prior to this one to repair tendons. Continued to have difficulty with ankle (mobility, pain, function).   .    Patient reports pain:  Lateral.  Radiates to:  Foot (pt reports persistance numbness along bottom  of L foot. Reports surgeon okay with healing process, no tingling elsewhere).  Pain is described as aching and is intermittent and reported as 2/10.  Associated symptoms:  Edema, loss of motion/stiffness, loss of strength, numbness and tingling.   Symptoms are exacerbated by activity, lying on the extremity, ascending stairs and descending stairs and relieved by rest, ice and bracing/immobilizing (pt sleeping with CAM boot on to help tolerate pain).  Since onset symptoms are gradually improving.    Previous treatment includes surgery.  There was mild improvement following previous treatment.  General health as reported by patient is excellent.  Pertinent medical history includes:  None.  Medical allergies: no.  Other surgeries include:  Orthopedic surgery (previous L ankle surgeries).  Current medications:  None as reported by patient.  Current occupation is Student  .            Red flags:  None as reported by patient.                        Objective:    Gait:  Ambulates with CAM boot on L  Tolerates walking 50' without boot with limited ankle DF/PF, decreased knee flexion and decreased gait speed, mild limp towards L side  Assistive Devices:  CAM            Ankle/Foot Evaluation  ROM:    AROM:    Dorsiflexion:  Left:   0  Right:   10  Plantarflexion:  Left:  52 pain     Right:  WNL  Inversion:  Left:  10     Right:  WNL  Eversion:  5     Right:  WNL      PROM:    Dorsiflexion:  Left:    14     Right:   20   Plantarflexion:  Left:    60 pain     Right:  WNL  Inversion:  Left:      20 pain     Right:   WNL  Eversion: Left:   10 pain     Right:  WNL          Strength is not assessed.  LIGAMENT TESTING: not assessed              SPECIAL TESTS: not assessed    PALPATION: Palpation of ankle: moderate pain along incicsion on lateral foot. denies tenderness thorughout other areas of L gastroc, ankle, foot.     EDEMA: Edema ankle: L gastroc moderate swelling.  Left ankle edema present at: 55.0 cm  Right ankle edema present at:  54.5      Figure 8 left: 55.0 cmFigure 8 right: 54.5                                           Hip Evaluation  Hip PROM:  Hip PROM:  Left Hip:    Normal  Right Hip:  Normal                          Hip Strength:    Flexion:   Left: 5-/5   Pain:  Right: 5-/5   Pain:                    Extension:  Left: 4+/5  Pain:Right: 4+/5    Pain:    Abduction:  Left: 4/5     Pain:Right: 4/5    Pain:        Knee Flexion:  Left: 5-/5   Pain:Right: 5-/5   Pain:  Knee Extension:  Left: 5/5   Pain:Right: 5/5    Pain:                 Knee Evaluation:  ROM:  PROM: normal                              General     ROS    Assessment/Plan:    Patient is a 17 year old female with left side ankle complaints.    Patient has the following significant findings with corresponding treatment plan.                Diagnosis 1:  L ankle pain. S/p L ankle arthoscopic debridement and lateral ligament repair with an internal brace.  Pain -  hot/cold therapy, US, electric stimulation, manual therapy, splint/taping/bracing/orthotics, self management, education and home program  Decreased ROM/flexibility - manual therapy and therapeutic exercise  Decreased joint mobility - manual therapy and therapeutic exercise  Decreased strength - therapeutic exercise and therapeutic activities  Impaired balance - neuro  re-education and therapeutic activities  Decreased proprioception - neuro re-education and therapeutic activities  Inflammation - cold therapy, US and electric stimulation  Edema - electric stimulation and cold therapy  Impaired gait - gait training    Therapy Evaluation Codes:   1) History comprised of:   Personal factors that impact the plan of care:      Past/current experiences.    Comorbidity factors that impact the plan of care are:      None.     Medications impacting care: None.  2) Examination of Body Systems comprised of:   Body structures and functions that impact the plan of care:      Ankle.   Activity limitations that impact the plan of care are:      Jumping, Lifting, Sitting, Squatting/kneeling, Stairs, Standing, Walking, Working and Sleeping.  3) Clinical presentation characteristics are:   Stable/Uncomplicated.  4) Decision-Making    Low complexity using standardized patient assessment instrument and/or measureable assessment of functional outcome.  Cumulative Therapy Evaluation is: Low complexity.    Previous and current functional limitations:  (See Goal Flow Sheet for this information)    Short term and Long term goals: (See Goal Flow Sheet for this information)     Communication ability:  Patient appears to be able to clearly communicate and understand verbal and written communication and follow directions correctly.  Treatment Explanation - The following has been discussed with the patient:   RX ordered/plan of care  Anticipated outcomes  Possible risks and side effects  This patient would benefit from PT intervention to resume normal activities.   Rehab potential is good.    Frequency:  1 X week, once daily  Duration:  for 12 weeks  Discharge Plan:  Achieve all LTG.  Independent in home treatment program.  Reach maximal therapeutic benefit.    Please refer to the daily flowsheet for treatment today, total treatment time and time spent performing 1:1 timed codes.

## 2018-02-05 ENCOUNTER — THERAPY VISIT (OUTPATIENT)
Dept: PHYSICAL THERAPY | Facility: CLINIC | Age: 18
End: 2018-02-05
Payer: COMMERCIAL

## 2018-02-05 DIAGNOSIS — M25.572 ACUTE LEFT ANKLE PAIN: ICD-10-CM

## 2018-02-05 PROCEDURE — 97110 THERAPEUTIC EXERCISES: CPT | Mod: GP

## 2018-02-05 PROCEDURE — 97140 MANUAL THERAPY 1/> REGIONS: CPT | Mod: GP

## 2018-02-05 NOTE — MR AVS SNAPSHOT
After Visit Summary   2/5/2018    Arianna Harper    MRN: [id number]           Patient Information     Date Of Birth          2000        Visit Information        Provider Department      2/5/2018 12:00 PM Isabell Power PTA [facility] Physical Therapy        Today's Diagnoses     Acute left ankle pain           Follow-ups after your visit        Your next 10 appointments already scheduled     Feb 12, 2018 12:00 PM CST   ABIGAIL Extremity with Isabell Power PTA   [facility] Physical Therapy ([facility]  )    [address] [place] [address]   [phone]            Feb 23, 2018  8:50 AM CST   ABIGAIL Extremity with Isabell Power PTA   [facility] Physical Therapy ([facility]  )    [address] [place] [address] [phone]            Mar 02, 2018  8:50 AM CST   ABIGAIL Extremity with Isabell Power PTA   [facility] Physical Therapy ([facility]  )    [address] [place] [address] [phone]              Who to contact     If you have questions or need follow up information about today's clinic visit or your schedule please contact [facility]ERAP[facility] directly at [phone].  Normal or non-critical lab and imaging results will be communicated to you by MyChart, letter or phone within 4 business days after the clinic has received the results. If you do not hear from us within 7 days, please contact the clinic through MyChart or phone. If you have a critical or abnormal lab result, we will notify you by phone as soon as possible.  Submit refill requests through Flowboard or call your pharmacy and they will forward the refill request to us. Please allow 3 business days for your refill to be completed.          Additional Information About Your Visit        MyChart Information     qcuet  "lets you send messages to your doctor, view your test results, renew your prescriptions, schedule appointments and more. To sign up, go to [email or web address] . Click on \"Log in\" on the left side of the screen, which will take you to the Welcome page. Then click on \"Sign up Now\" on the right side of the page.     You will be asked to enter the access code listed below, as well as some personal information. Please follow the directions to create your username and password.     Your access code is: [email or web address]  Expires: 2018 12:19 PM     Your access code will  in 90 days. If you need help or a new code, please call your [facility] clinic or [phone].        Care EveryWhere ID     This is your Care EveryWhere ID. This could be used by other organizations to access your [facility] medical records  [id number]         Blood Pressure from Last 3 Encounters:   18 125/77    Weight from Last 3 Encounters:   18 106.5 kg (234 lb 11.2 oz) (>99 %)*     * Growth percentiles are based on CDC 2-20 Years data.              We Performed the Following     Manual Ther Tech, 1+Regions, EA 15 min     Therapeutic Exercises        Primary Care Provider Office Phone # Fax #    Kristal Duran [phone]       [employer] [address] [facility] [address]        Equal Access to Services     [facility] : Hadii aad ku hadasho Soomaali, waaxda luqadaha, qaybta kaalmada adeegyada, tran arnold . So [facility] [phone].    ATENCIÓN: Si habla español, tiene a long disposición servicios gratuitos de asistencia lingüística. Manpreet al [phone].    We comply with applicable federal civil rights laws and [place] laws. We do not discriminate on the basis of race, color, national origin, age, disability, sex, sexual orientation, or gender identity.            Thank you!     Thank you for choosing [facility] PHYSICAL THERAPY  for " your care. Our goal is always to provide you with excellent care. Hearing back from our patients is one way we can continue to improve our services. Please take a few minutes to complete the written survey that you may receive in the mail after your visit with us. Thank you!             Your Updated Medication List - Protect others around you: Learn how to safely use, store and throw away your medicines at www.disposemymeds.org.          This list is accurate as of 2/5/18  8:32 PM.  Always use your most recent med list.                   Brand Name Dispense Instructions for use Diagnosis    HYDROcodone-acetaminophen 5-325 MG per tablet    NORCO    30 tablet    Take 1-2 tablets by mouth every 4 hours as needed for other (Moderate to Severe Pain)    S/P ankle ligament repair       hydrOXYzine 25 MG tablet    ATARAX    30 tablet    Take 1 tablet (25 mg) by mouth every 6 hours as needed for itching (and nausea)    S/P ankle ligament repair       ibuprofen 600 MG tablet    ADVIL/MOTRIN    40 tablet    Take 1 tablet (600 mg) by mouth every 6 hours as needed for pain (mild)    S/P ankle ligament repair       senna-docusate 8.6-50 MG per tablet    SENOKOT-S;PERICOLACE    30 tablet    Take 1-2 tablets by mouth 2 times daily Take while on oral narcotics to prevent or treat constipation.    S/P ankle ligament repair       VITAMIN C PO      Take by mouth as needed

## 2018-02-12 ENCOUNTER — THERAPY VISIT (OUTPATIENT)
Dept: PHYSICAL THERAPY | Facility: CLINIC | Age: 18
End: 2018-02-12
Payer: COMMERCIAL

## 2018-02-12 DIAGNOSIS — M25.572 ACUTE LEFT ANKLE PAIN: ICD-10-CM

## 2018-02-12 PROCEDURE — 97140 MANUAL THERAPY 1/> REGIONS: CPT | Mod: GP

## 2018-02-12 PROCEDURE — 97110 THERAPEUTIC EXERCISES: CPT | Mod: GP

## 2018-02-12 NOTE — PROGRESS NOTES
Subjective:  HPI                    Objective:  System    Physical Exam    General     ROS    Assessment/Plan:    SUBJECTIVE  Subjective changes as noted by pt:   Pt continues to wear CAM boot when out of her house about 90% of the time. WHen she doesn't wear it, foot and ankle swell a lot and becomes achy. Ankle feels weak.    Current pain level: 3/10     Changes in function:  Yes (See Goal flowsheet attached for changes in current functional level)     Adverse reaction to treatment or activity:  None    OBJECTIVE  Changes in objective findings:  Yes,   Objective: AROM L/R ankle DF 10/14, PF 40/60, inv 36/43, ever 19/23. Mod swelling troughout ankle, min in foot. Ant scope site min tender and adherent affecting end range DF, improved after TFM. Good maximo resistance inv, ever, DF. Seated toe raises fatigue quickly      ASSESSMENT  Arianna continues to require intervention to meet STG and LTG's: PT  Patient is progressing as expected.  Response to therapy has shown an improvement in  ROM   Progress made towards STG/LTG?  Yes (See Goal flowsheet attached for updates on achievement of STG and LTG)    PLAN  Current treatment program is being advanced to more complex exercises.    PTA/ATC plan:  Will continue with present plan of care.    Please refer to the daily flowsheet for treatment today, total treatment time and time spent performing 1:1 timed codes.

## 2018-02-12 NOTE — MR AVS SNAPSHOT
"              After Visit Summary   2/12/2018    Arianna Harper    MRN: [id number]           Patient Information     Date Of Birth          2000        Visit Information        Provider Department      2/12/2018 12:00 PM Isabell Power PTA [facility] Physical Therapy        Today's Diagnoses     Acute left ankle pain           Follow-ups after your visit        Your next 10 appointments already scheduled     Feb 23, 2018  8:50 AM CST   ABIGAIL Extremity with Isabell Power PTA   [facility] Physical Therapy ([facility]  )    [address] [place] [address] [phone]            Mar 02, 2018  8:50 AM CST   ABIGAIL Extremity with Isabell Power PTA   [facility] ([address] [address] [phone]              Who to contact     If you have questions or need follow up information about today's clinic visit or your schedule please contact [facility] PHYSICAL THERAPY directly at [phone].  Normal or non-critical lab and imaging results will be communicated to you by GeoVShart, letter or phone within 4 business days after the clinic has received the results. If you do not hear from us within 7 days, please contact the clinic through Clinithinkt or phone. If you have a critical or abnormal lab result, we will notify you by phone as soon as possible.  Submit refill requests through Datappraise or call your pharmacy and they will forward the refill request to us. Please allow 3 business days for your refill to be completed.          Additional Information About Your Visit        GeoVSharVdolg Information     Datappraise lets you send messages to your doctor, view your test results, renew your prescriptions, schedule appointments and more. To sign up, go to [email or web address] . Click on \"Log in\" on the left side of the screen, " "which will take you to the Welcome page. Then click on \"Sign up Now\" on the right side of the page.     You will be asked to enter the access code listed below, as well as some personal information. Please follow the directions to create your username and password.     Your access code is: [email or web address]  Expires: 2018 12:19 PM     Your access code will  in 90 days. If you need help or a new code, please call your [facility] or [phone].        Care EveryWhere ID     This is your Care EveryWhere ID. This could be used by other organizations to access your [place] medical records  [id number]         Blood Pressure from Last 3 Encounters:   18 125/77    Weight from Last 3 Encounters:   18 106.5 kg (234 lb 11.2 oz) (>99 %)*     * Growth percentiles are based on [facility] 2-20 Years data.              We Performed the Following     Manual Ther Tech, 1+Regions, EA 15 min     Therapeutic Exercises        Primary Care Provider Office Phone # Fax #    Kristal Duran [phone]       [facility] [address] [facility] [address]        Equal Access to Services     RAQUEL TSANG : Hadii aad ku hadasho Somaria alejandraali, waaxda luqadaha, qaybta kaalmada adeegyada, tran burns. So [facility] [phone].    ATENCIÓN: Si habla español, tiene a long disposición servicios gratuitos de asistencia lingüística. Llame al [phone].    We comply with applicable federal civil rights laws and [place] laws. We do not discriminate on the basis of race, color, national origin, age, disability, sex, sexual orientation, or gender identity.            Thank you!     Thank you for choosing [facility] PHYSICAL THERAPY  for your care. Our goal is always to provide you with excellent care. Hearing back from our patients is one way we can continue to improve our services. Please take a few minutes to complete the written survey that you " may receive in the mail after your visit with us. Thank you!             Your Updated Medication List - Protect others around you: Learn how to safely use, store and throw away your medicines at www.disposemymeds.org.          This list is accurate as of 2/12/18  1:14 PM.  Always use your most recent med list.                   Brand Name Dispense Instructions for use Diagnosis    HYDROcodone-acetaminophen 5-325 MG per tablet    NORCO    30 tablet    Take 1-2 tablets by mouth every 4 hours as needed for other (Moderate to Severe Pain)    S/P ankle ligament repair       hydrOXYzine 25 MG tablet    ATARAX    30 tablet    Take 1 tablet (25 mg) by mouth every 6 hours as needed for itching (and nausea)    S/P ankle ligament repair       ibuprofen 600 MG tablet    ADVIL/MOTRIN    40 tablet    Take 1 tablet (600 mg) by mouth every 6 hours as needed for pain (mild)    S/P ankle ligament repair       senna-docusate 8.6-50 MG per tablet    SENOKOT-S;PERICOLACE    30 tablet    Take 1-2 tablets by mouth 2 times daily Take while on oral narcotics to prevent or treat constipation.    S/P ankle ligament repair       VITAMIN C PO      Take by mouth as needed

## 2018-02-23 ENCOUNTER — THERAPY VISIT (OUTPATIENT)
Dept: PHYSICAL THERAPY | Facility: CLINIC | Age: 18
End: 2018-02-23
Payer: COMMERCIAL

## 2018-02-23 DIAGNOSIS — M25.572 ACUTE LEFT ANKLE PAIN: ICD-10-CM

## 2018-02-23 PROCEDURE — 97112 NEUROMUSCULAR REEDUCATION: CPT | Mod: GP

## 2018-02-23 PROCEDURE — 97140 MANUAL THERAPY 1/> REGIONS: CPT | Mod: GP

## 2018-02-23 PROCEDURE — 97110 THERAPEUTIC EXERCISES: CPT | Mod: GP

## 2018-02-23 NOTE — MR AVS SNAPSHOT
"              After Visit Summary   2/23/2018    Arianna Harper    MRN: [id number]           Patient Information     Date Of Birth          2000        Visit Information        Provider Department      2/23/2018 2:00 PM Isabell Power PTA [facility] Physical Therapy        Today's Diagnoses     Acute left ankle pain           Follow-ups after your visit        Your next 10 appointments already scheduled     Mar 02, 2018  8:50 AM CST   ABIGAIL Extremity with Isabell Power PTA   [facility] Physical Therapy ([facility]  )    [address] [place] [id number] [phone]              Who to contact     If you have questions or need follow up information about today's clinic visit or your schedule please contact [facility] PHYSICAL [facility] directly at [phone].  Normal or non-critical lab and imaging results will be communicated to you by Aunalyticshart, letter or phone within 4 business days after the clinic has received the results. If you do not hear from us within 7 days, please contact the clinic through Aunalyticshart or phone. If you have a critical or abnormal lab result, we will notify you by phone as soon as possible.  Submit refill requests through L[employer] or call your pharmacy and they will forward the refill request to us. Please allow 3 business days for your refill to be completed.          Additional Information About Your Visit        MyChart Information     L[employer] lets you send messages to your doctor, view your test results, renew your prescriptions, schedule appointments and more. To sign up, go to [email or web address] . Click on \"Log in\" on the left side of the screen, which will take you to the Welcome page. Then click on \"Sign up Now\" on the right side of the page.     You will be asked to enter the access code listed below, as well as some personal information. Please follow the " directions to create your username and password.     Your access code is: [email or web address]  Expires: 2018 12:19 PM     Your access code will  in 90 days. If you need help or a new code, please call your [place] clinic or [phone].        Care EveryWhere ID     This is your Care EveryWhere ID. This could be used by other organizations to access your [place] medical records  [id number]         Blood Pressure from Last 3 Encounters:   18 125/77    Weight from Last 3 Encounters:   18 106.5 kg (234 lb 11.2 oz) (>99 %)*     * Growth percentiles are based on [facility] 2-20 Years data.              We Performed the Following     ABIGAIL Progress Notes Report     Manual Ther Tech, 1+Regions, EA 15 min     Neuromuscular Re-Education     Therapeutic Exercises        Primary Care Provider Office Phone # Fax #    Kristal Duran [phone]       [facility] [address]  [facility] [address]        Equal Access to Services     RAQUEL TSANG AH: Hadii aad ku hadasho Soomaali, waaxda luqadaha, qaybta kaalmada adeegyada, waxay idiin hayaan adeeg kharavioleta laira . So [facility] [phone].    ATENCIÓN: Si habla español, tiene a long disposición servicios gratuitos de asistencia lingüística. [facility] [phone].    We comply with applicable federal civil rights laws and [place] laws. We do not discriminate on the basis of race, color, national origin, age, disability, sex, sexual orientation, or gender identity.            Thank you!     Thank you for choosing [facility] PHYSICAL THERAPY  for your care. Our goal is always to provide you with excellent care. Hearing back from our patients is one way we can continue to improve our services. Please take a few minutes to complete the written survey that you may receive in the mail after your visit with us. Thank you!             Your Updated Medication List - Protect others around you: Learn how to safely use,  store and throw away your medicines at www.disposemymeds.org.          This list is accurate as of 2/23/18  8:31 PM.  Always use your most recent med list.                   Brand Name Dispense Instructions for use Diagnosis    HYDROcodone-acetaminophen 5-325 MG per tablet    NORCO    30 tablet    Take 1-2 tablets by mouth every 4 hours as needed for other (Moderate to Severe Pain)    S/P ankle ligament repair       hydrOXYzine 25 MG tablet    ATARAX    30 tablet    Take 1 tablet (25 mg) by mouth every 6 hours as needed for itching (and nausea)    S/P ankle ligament repair       ibuprofen 600 MG tablet    ADVIL/MOTRIN    40 tablet    Take 1 tablet (600 mg) by mouth every 6 hours as needed for pain (mild)    S/P ankle ligament repair       senna-docusate 8.6-50 MG per tablet    SENOKOT-S;PERICOLACE    30 tablet    Take 1-2 tablets by mouth 2 times daily Take while on oral narcotics to prevent or treat constipation.    S/P ankle ligament repair       VITAMIN C PO      Take by mouth as needed

## 2018-02-24 NOTE — PROGRESS NOTES
Subjective:  HPI                    Objective:  System    Physical Exam    General     ROS    Assessment/Plan:    PROGRESS  REPORT    Progress reporting period is from 2/2/18 to 2/23/18.       SUBJECTIVE  Subjective changes noted by patient:   Continues to wear CAM boot to walk long distances to class. Feels weak and fearful to go without boot due to inc swelling. Saw Dr Joshi yesterday who is pleased with progress.  Pt was told to expect swelling and discomfort for several months yet.     Current pain level is 1/10 Current Pain level: 1/10.     Previous pain level was  2/10 Initial Pain level: 2/10.   Changes in function:  Yes (See Goal flowsheet attached for changes in current functional level)  Adverse reaction to treatment or activity: None    OBJECTIVE  Changes noted in objective findings:  Yes,   Objective: Mild swelling throughout ankle  AROM DF 10, PF 53, inv 40, ever 10. Strength ever 4-/5, inver 4/5, PF strength just begun today 4-/5. Min- tender over scope sites. Min ant ankle pain with single leg step down - improved after post subtalar mobs.      ASSESSMENT/PLAN  Updated problem list and treatment plan: Diagnosis 1:  S/p lateral ligamentous reconstruction L ankle  Pain -  hot/cold therapy, manual therapy, self management and education  Decreased ROM/flexibility - manual therapy, therapeutic exercise and home program  Decreased strength - therapeutic exercise, therapeutic activities and home program  STG/LTGs have been met or progress has been made towards goals:  Yes (See Goal flow sheet completed today.)  Assessment of Progress: The patient's condition is improving.  Self Management Plans:  Patient has been instructed in a home treatment program.  I have re-evaluated this patient and find that the nature, scope, duration and intensity of the therapy is appropriate for the medical condition of the patient.  Arianna continues to require the following intervention to meet STG and LTG's:  PT    Pt may  benefit from use of ASO lace up ankle brace to help her DC use of CAM boot when walking across the campus at .     Recommendations:  This patient would benefit from continued therapy.     Frequency:  1 X week, once daily  Duration:  for 6 more visits to progress strength, ROM, gt, swelling control and fuction      The progress note/discharge summary was written in collaboration with and reviewed by the physical therapist.    Please refer to the daily flowsheet for treatment today, total treatment time and time spent performing 1:1 timed codes.

## 2018-03-05 ENCOUNTER — THERAPY VISIT (OUTPATIENT)
Dept: PHYSICAL THERAPY | Facility: CLINIC | Age: 18
End: 2018-03-05
Payer: COMMERCIAL

## 2018-03-05 DIAGNOSIS — M25.572 ACUTE LEFT ANKLE PAIN: ICD-10-CM

## 2018-03-05 PROCEDURE — 97110 THERAPEUTIC EXERCISES: CPT | Mod: GP

## 2018-03-05 PROCEDURE — 97112 NEUROMUSCULAR REEDUCATION: CPT | Mod: GP

## 2018-03-05 NOTE — MR AVS SNAPSHOT
"              After Visit Summary   3/5/2018    Arianna Harper    MRN: [id number]           Patient Information     Date Of Birth          2000        Visit Information        Provider Department      3/5/2018 8:10 AM Isabell Power PTA [facility] Physical Therapy        Today's Diagnoses     Acute left ankle pain           Follow-ups after your visit        Your next 10 appointments already scheduled     Mar 19, 2018 12:00 PM CDT   ABIGAIL Extremity with Isabell Power PTA   [facility] Physical Therapy ([facility]  )    [address] [place] [id number] [phone]              Who to contact     If you have questions or need follow up information about today's clinic visit or your schedule please contact [facility] PHYSICAL [facility] directly at [phone].  Normal or non-critical lab and imaging results will be communicated to you by MyChart, letter or phone within 4 business days after the clinic has received the results. If you do not hear from us within 7 days, please contact the clinic through Manufacturers' Inventoryhart or phone. If you have a critical or abnormal lab result, we will notify you by phone as soon as possible.  Submit refill requests through Zen Planner or call your pharmacy and they will forward the refill request to us. Please allow 3 business days for your refill to be completed.          Additional Information About Your Visit        MyChart Information     Zen [employer]anner lets you send messages to your doctor, view your test results, renew your prescriptions, schedule appointments and more. To sign up, go to [email or web address] . Click on \"Log in\" on the left side of the screen, which will take you to the Welcome page. Then click on \"Sign up Now\" on the right side of the page.     You will be asked to enter the access code listed below, as well as some personal information. Please follow the directions " to create your username and password.     Your access code is: [email or web address]  Expires: 2018 12:19 PM     Your access code will  in 90 days. If you need help or a new code, please call your [facility] or [phone].        Care EveryWhere ID     This is your Care EveryWhere ID. This could be used by other organizations to access your [place] medical records  [id number]         Blood Pressure from Last 3 Encounters:   18 125/77    Weight from Last 3 Encounters:   18 106.5 kg (234 lb 11.2 oz) (>99 %)*     * Growth percentiles are based on [facility] 2-20 Years data.              We Performed the Following     Neuromuscular Re-Education     Therapeutic Exercises        Primary Care Provider Office Phone # Fax #    Kristal Duran [phone]       [facility] [address] [facility] [address]        Equal Access to Services     RAQUEL TSANG : Hadii aad ku hadasho Soomaali, waaxda luqadaha, qaybta kaalmada adeegyada, waxay aurelianoin haydebn kezia arnold . So [facility] [phone].    ATENCIÓN: Si albertinala español, tiene a long disposición servicios gratuitos de asistencia lingüística. Llame al [phone].    We comply with applicable federal civil rights laws and [place] laws. We do not discriminate on the basis of race, color, national origin, age, disability, sex, sexual orientation, or gender identity.            Thank you!     Thank you for choosing [facility] PHYSICAL THERAPY  for your care. Our goal is always to provide you with excellent care. Hearing back from our patients is one way we can continue to improve our services. Please take a few minutes to complete the written survey that you may receive in the mail after your visit with us. Thank you!             Your Updated Medication List - Protect others around you: Learn how to safely use, store and throw away your medicines at www.disposemymeds.org.          This list is  accurate as of 3/5/18  9:35 AM.  Always use your most recent med list.                   Brand Name Dispense Instructions for use Diagnosis    HYDROcodone-acetaminophen 5-325 MG per tablet    NORCO    30 tablet    Take 1-2 tablets by mouth every 4 hours as needed for other (Moderate to Severe Pain)    S/P ankle ligament repair       hydrOXYzine 25 MG tablet    ATARAX    30 tablet    Take 1 tablet (25 mg) by mouth every 6 hours as needed for itching (and nausea)    S/P ankle ligament repair       ibuprofen 600 MG tablet    ADVIL/MOTRIN    40 tablet    Take 1 tablet (600 mg) by mouth every 6 hours as needed for pain (mild)    S/P ankle ligament repair       senna-docusate 8.6-50 MG per tablet    SENOKOT-S;PERICOLACE    30 tablet    Take 1-2 tablets by mouth 2 times daily Take while on oral narcotics to prevent or treat constipation.    S/P ankle ligament repair       VITAMIN C PO      Take by mouth as needed

## 2018-03-19 ENCOUNTER — THERAPY VISIT (OUTPATIENT)
Dept: PHYSICAL THERAPY | Facility: CLINIC | Age: 18
End: 2018-03-19
Payer: COMMERCIAL

## 2018-03-19 DIAGNOSIS — M25.572 ACUTE LEFT ANKLE PAIN: ICD-10-CM

## 2018-03-19 PROCEDURE — 97530 THERAPEUTIC ACTIVITIES: CPT | Mod: GP

## 2018-03-19 PROCEDURE — 97110 THERAPEUTIC EXERCISES: CPT | Mod: GP

## 2018-03-19 PROCEDURE — 97112 NEUROMUSCULAR REEDUCATION: CPT | Mod: GP

## 2018-03-19 NOTE — LETTER
[facility] PHYSICAL THERAPY  [address] [place] [address]  [phone]    2018    Re: Arianna Harper   :   2000  MRN:  [id number]   REFERRING PHYSICIAN:   Christie Joshi    [facility] PHYSICAL [facility]    Date of Initial Evaluation:  2018  Visits:  Rxs Used: 6  Reason for Referral:  Acute left ankle pain    EVALUATION SUMMARY    Assessment/Plan:    PROGRESS  REPORT  Progress reporting period is from 18 to 3/19/18.       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 Elastic sleeve helps some.  No episodes of twisting or feeling unstable. Feels 80% normal now. Reports that she tried stationary biking for 60 min 2 weeks ago and ankle was sore for next 2 days.     Current pain level is 1/10  .     Previous pain level was  2/10 Initial Pain level: 2/10.   Changes in function:  Yes (See Goal flowsheet attached for changes in current functional level)  Adverse reaction to treatment or activity: None    OBJECTIVE  Changes noted in objective findings:  Yes,   Objective: Strength PF 4+/5, eversion 4+/5, inver 5-/5, DF 5/5. Balance  good eyes closed 20 sec. Hesitant but able to start some jumping and landing today. Able to take some jogging steps without pain.     ASSESSMENT/PLAN  Updated problem list and treatment plan: Diagnosis 1:  S/p L ankle ligamentous reconstruction  Decreased strength - therapeutic exercise, therapeutic activities and home program  Decreased function - therapeutic activities, home program and functional performance testing  STG/LTGs have been met or progress has been made towards goals:  Yes (See Goal flow sheet completed today.)  Assessment of Progress: The patient's condition is improving.  Self Management Plans:  Patient has been instructed in a home treatment program.  I have re-evaluated  this patient and find that the nature, scope, duration and intensity of the therapy is appropriate for the medical condition of the patient.  Arianna continues to require the following intervention to meet STG and LTG's:  PT  Arianna CYNDI Leroy     Recommendations:  This patient would benefit from continued therapy.     Frequency:  1 X a month, once daily if needed for supervised progressive stregthening, otherwise may continue strengthening independently.   Duration:  for 1-2 visits    The progress note/discharge summary was written in collaboration with and reviewed by the physical therapist.    Please refer to the daily flowsheet for treatment today, total treatment time and time spent performing 1:1 timed codes.    Thank you for your referral.    INQUIRIES  Therapist: Isabell Power PTA.[facility]  [facility] [place] [facility]ICAL THERAP[facility]  [address] [place] [id number]  Phone: [phone]  Fax: [phone]

## 2018-03-19 NOTE — PROGRESS NOTES
Subjective:  HPI                    Objective:  System    Physical Exam    General     ROS    Assessment/Plan:    PROGRESS  REPORT    Progress reporting period is from 2/23/18 to 3/19/18.       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 Elastic sleeve helps some.  No episodes of twisting or feeling unstable. Feels 80% normal now. Reports that she tried stationary biking for 60 min 2 weeks ago and ankle was sore for next 2 days.     Current pain level is 1/10  .     Previous pain level was  2/10 Initial Pain level: 2/10.   Changes in function:  Yes (See Goal flowsheet attached for changes in current functional level)  Adverse reaction to treatment or activity: None    OBJECTIVE  Changes noted in objective findings:  Yes,   Objective: Strength PF 4+/5, eversion 4+/5, inver 5-/5, DF 5/5. Balance  good eyes closed 20 sec. Hesitant but able to start some jumping and landing today. Able to take some jogging steps without pain.     ASSESSMENT/PLAN  Updated problem list and treatment plan: Diagnosis 1:  S/p L ankle ligamentous reconstruction  Decreased strength - therapeutic exercise, therapeutic activities and home program  Decreased function - therapeutic activities, home program and functional performance testing  STG/LTGs have been met or progress has been made towards goals:  Yes (See Goal flow sheet completed today.)  Assessment of Progress: The patient's condition is improving.  Self Management Plans:  Patient has been instructed in a home treatment program.  I have re-evaluated this patient and find that the nature, scope, duration and intensity of the therapy is appropriate for the medical condition of the patient.  Arianna continues to require the following intervention to meet STG and LTG's:  PT    Recommendations:  This patient would benefit from continued therapy.     Frequency:  1 X a month, once daily if needed for  supervised progressive stregthening, otherwise may continue strengthening independently.   Duration:  for 1-2 visits      The progress note/discharge summary was written in collaboration with and reviewed by the physical therapist.    Please refer to the daily flowsheet for treatment today, total treatment time and time spent performing 1:1 timed codes.

## 2018-03-19 NOTE — MR AVS SNAPSHOT
"              After Visit Summary   3/19/2018    Arianna Harper    MRN: [id number]           Patient Information     Date Of Birth          2000        Visit Information        Provider Department      3/19/2018 12:00 PM Isabell Power PTA [facility] Physical Therapy        Today's Diagnoses     Acute left ankle pain           Follow-ups after your visit        Who to contact     If you have questions or need follow up information about today's clinic visit or your schedule please contact [facility] directly at [phone].  Normal or non-critical lab and imaging results will be communicated to you by [employer], letter or phone within 4 business days after the clinic has received the results. If you do not hear from us within 7 days, please contact the clinic through [employer] or phone. If you have a critical or abnormal lab result, we will notify you by phone as soon as possible.  Submit refill requests through [facility] or call your pharmacy and they will forward the refill request to us. Please allow 3 business days for your refill to be completed.          Additional Information About Your Visit        MyChart Information     [facility] lets you send messages to your doctor, view your test results, renew your prescriptions, schedule appointments and more. To sign up, go to [email or web address] . Click on \"Log in\" on the left side of the screen, which will take you to the Welcome page. Then click on \"Sign up Now\" on the right side of the page.     You will be asked to enter the access code listed below, as well as some personal information. Please follow the directions to create your username and password.     Your access code is: [email or web address]  Expires: 2018  1:19 PM     Your access code will  in 90 days. If you need help or a new code, please call your [facility] clinic or [phone].        Care EveryWhere ID     This " is your Care EveryWhere ID. This could be used by other organizations to access your [facility] medical records  [id number]         Blood Pressure from Last 3 Encounters:   01/08/18 125/77    Weight from Last 3 Encounters:   01/08/18 106.5 kg (234 lb 11.2 oz) (>99 %)*     * Growth percentiles are based on [facility] 2-20 Years data.              We Performed the Following     ABIGAIL Progress Notes Report     Neuromuscular Re-Education     Therapeutic Activities     Therapeutic Exercises        Primary Care Provider Office Phone # Fax #    Kristal Duran [phone]       [facility] [address] [facility] [address]        Equal Access to Services     REYNA TSANG : Hadii aad ku hadasho Soomaali, waaxda luqadaha, qaybta kaalmada adeegyada, waxay idiin hayaan kezia arnold . So [facility] [phone].    ATENCIÓN: Si habla español, tiene a long disposición servicios gratuitos de asistencia lingüística. [facility] [phone].    We comply with applicable federal civil rights laws and [place] laws. We do not discriminate on the basis of race, color, national origin, age, disability, sex, sexual orientation, or gender identity.            Thank you!     Thank you for choosing [facility]  for your care. Our goal is always to provide you with excellent care. Hearing back from our patients is one way we can continue to improve our services. Please take a few minutes to complete the written survey that you may receive in the mail after your visit with us. Thank you!             Your Updated Medication List - Protect others around you: Learn how to safely use, store and throw away your medicines at www.disposemymeds.org.          This list is accurate as of 3/19/18  3:14 PM.  Always use your most recent med list.                   Brand Name Dispense Instructions for use Diagnosis    HYDROcodone-acetaminophen 5-325 MG per tablet    NORCO    30  tablet    Take 1-2 tablets by mouth every 4 hours as needed for other (Moderate to Severe Pain)    S/P ankle ligament repair       hydrOXYzine 25 MG tablet    ATARAX    30 tablet    Take 1 tablet (25 mg) by mouth every 6 hours as needed for itching (and nausea)    S/P ankle ligament repair       ibuprofen 600 MG tablet    ADVIL/MOTRIN    40 tablet    Take 1 tablet (600 mg) by mouth every 6 hours as needed for pain (mild)    S/P ankle ligament repair       senna-docusate 8.6-50 MG per tablet    SENOKOT-S;PERICOLACE    30 tablet    Take 1-2 tablets by mouth 2 times daily Take while on oral narcotics to prevent or treat constipation.    S/P ankle ligament repair       VITAMIN C PO      Take by mouth as needed

## 2018-07-06 PROBLEM — M25.572 ANKLE PAIN, LEFT: Status: RESOLVED | Noted: 2018-02-02 | Resolved: 2018-07-06

## 2022-11-14 ENCOUNTER — TELEPHONE (OUTPATIENT)
Dept: VASCULAR SURGERY | Facility: CLINIC | Age: 22
End: 2022-11-14

## (undated) DEVICE — BLADE SAW SAGITTAL STRK 18X90X1.27MM HD SYS 6 6118-127-090

## (undated) DEVICE — BLADE SAW SAGITTAL STRK SHORT 35.5X9X0.64MM 2108-148-000

## (undated) DEVICE — IMM LIMB ELEVATOR DC40-0203

## (undated) DEVICE — CAST PADDING 4" UNSTERILE 9044

## (undated) DEVICE — CAST PADDING 4" STERILE 9044S

## (undated) DEVICE — DRSG ABDOMINAL 07 1/2X8" 7197D

## (undated) DEVICE — PREP DURAPREP 26ML APL 8630

## (undated) DEVICE — SUCTION CANISTER MEDIVAC LINER 3000ML W/LID 65651-530

## (undated) DEVICE — GOWN XXLG REINFORCED 9071EL

## (undated) DEVICE — GLOVE PROTEXIS W/NEU-THERA 8.0  2D73TE80

## (undated) DEVICE — LINEN TOWEL PACK X5 5464

## (undated) DEVICE — DRAPE SHEET REV FOLD 3/4 9349

## (undated) DEVICE — PACK EXTREMITY SOP15EXFSD

## (undated) DEVICE — SU VICRYL 3-0 PS-1 18" UND J683

## (undated) DEVICE — STPL SKIN 35W 059037

## (undated) DEVICE — SPONGE SURGIFOAM 100 1974

## (undated) DEVICE — BLADE KNIFE SURG 15 371115

## (undated) DEVICE — GLOVE PROTEXIS MICRO 8.5  2D73PM85

## (undated) DEVICE — GLOVE PROTEXIS W/NEU-THERA 7.5  2D73TE75

## (undated) DEVICE — CAST PLASTER SPLINT 5X30" 7395

## (undated) DEVICE — BNDG ELASTIC 4" DBL LENGTH UNSTERILE 6611-14

## (undated) DEVICE — DEVICE RETRIEVER HEWSON 71111579

## (undated) DEVICE — BLADE SAW OSCILLATING STRYK MED 9.0X25X0.38MM 2296-003-111

## (undated) DEVICE — GLOVE PROTEXIS W/NEU-THERA 8.5  2D73TE85

## (undated) RX ORDER — FENTANYL CITRATE 50 UG/ML
INJECTION, SOLUTION INTRAMUSCULAR; INTRAVENOUS
Status: DISPENSED
Start: 2018-01-08

## (undated) RX ORDER — KETOROLAC TROMETHAMINE 30 MG/ML
INJECTION, SOLUTION INTRAMUSCULAR; INTRAVENOUS
Status: DISPENSED
Start: 2018-01-08

## (undated) RX ORDER — HYDROCODONE BITARTRATE AND ACETAMINOPHEN 5; 325 MG/1; MG/1
TABLET ORAL
Status: DISPENSED
Start: 2018-01-08

## (undated) RX ORDER — HYDROMORPHONE HYDROCHLORIDE 1 MG/ML
INJECTION, SOLUTION INTRAMUSCULAR; INTRAVENOUS; SUBCUTANEOUS
Status: DISPENSED
Start: 2018-01-08

## (undated) RX ORDER — PROPOFOL 10 MG/ML
INJECTION, EMULSION INTRAVENOUS
Status: DISPENSED
Start: 2018-01-08